# Patient Record
Sex: MALE | Race: WHITE
[De-identification: names, ages, dates, MRNs, and addresses within clinical notes are randomized per-mention and may not be internally consistent; named-entity substitution may affect disease eponyms.]

---

## 2018-04-01 ENCOUNTER — HOSPITAL ENCOUNTER (EMERGENCY)
Dept: HOSPITAL 45 - C.EDB | Age: 28
Discharge: HOME | End: 2018-04-01
Payer: COMMERCIAL

## 2018-04-01 VITALS
HEIGHT: 70 IN | BODY MASS INDEX: 22.16 KG/M2 | WEIGHT: 154.76 LBS | HEIGHT: 70 IN | WEIGHT: 154.76 LBS | BODY MASS INDEX: 22.16 KG/M2

## 2018-04-01 VITALS — TEMPERATURE: 98.06 F

## 2018-04-01 VITALS — HEART RATE: 68 BPM | DIASTOLIC BLOOD PRESSURE: 74 MMHG | SYSTOLIC BLOOD PRESSURE: 126 MMHG | OXYGEN SATURATION: 98 %

## 2018-04-01 DIAGNOSIS — Z82.49: ICD-10-CM

## 2018-04-01 DIAGNOSIS — M13.0: Primary | ICD-10-CM

## 2018-04-01 DIAGNOSIS — F17.210: ICD-10-CM

## 2018-04-01 LAB
BASOPHILS # BLD: 0.02 K/UL (ref 0–0.2)
BASOPHILS NFR BLD: 0.5 %
BUN SERPL-MCNC: 14 MG/DL (ref 7–18)
CALCIUM SERPL-MCNC: 8.5 MG/DL (ref 8.5–10.1)
CO2 SERPL-SCNC: 30 MMOL/L (ref 21–32)
CREAT SERPL-MCNC: 1.08 MG/DL (ref 0.6–1.4)
EOS ABS #: 0.11 K/UL (ref 0–0.5)
EOSINOPHIL NFR BLD AUTO: 194 K/UL (ref 130–400)
GLUCOSE SERPL-MCNC: 102 MG/DL (ref 70–99)
HCT VFR BLD CALC: 38.1 % (ref 42–52)
HGB BLD-MCNC: 13 G/DL (ref 14–18)
IG#: 0.01 K/UL (ref 0–0.02)
IMM GRANULOCYTES NFR BLD AUTO: 19.5 %
LYMPHOCYTES # BLD: 0.83 K/UL (ref 1.2–3.4)
MCH RBC QN AUTO: 28.2 PG (ref 25–34)
MCHC RBC AUTO-ENTMCNC: 34.1 G/DL (ref 32–36)
MCV RBC AUTO: 82.6 FL (ref 80–100)
MONO ABS #: 0.21 K/UL (ref 0.11–0.59)
MONOCYTES NFR BLD: 4.9 %
NEUT ABS #: 3.07 K/UL (ref 1.4–6.5)
NEUTROPHILS # BLD AUTO: 2.6 %
NEUTROPHILS NFR BLD AUTO: 72.3 %
PMV BLD AUTO: 10.5 FL (ref 7.4–10.4)
POTASSIUM SERPL-SCNC: 3.3 MMOL/L (ref 3.5–5.1)
RED CELL DISTRIBUTION WIDTH CV: 13.2 % (ref 11.5–14.5)
RED CELL DISTRIBUTION WIDTH SD: 40.2 FL (ref 36.4–46.3)
SODIUM SERPL-SCNC: 141 MMOL/L (ref 136–145)
WBC # BLD AUTO: 4.25 K/UL (ref 4.8–10.8)

## 2018-04-01 NOTE — EMERGENCY ROOM VISIT NOTE
History


Report prepared by Bhaskar:  Cuong Baker


Under the Supervision of:  Dr. Ayo Renee M.D.


First contact with patient:  20:04


Chief Complaint:  SWELLING TO EXTREMITY


Stated Complaint:  SWELLING TO HANDS AND KNEES





History of Present Illness


The patient is a 27 year old male who presents to the Emergency Room with 

complaints of worsening joint swelling starting five days ago.  He states the 

pain is in all of his joints, specifically his elbows wrists and knees legs 

bilaterally.  He rates his pain as a 6/10 in severity. He describes the pain as 

a dull pain. The patient states that he developed a fever one night a week ago. 

He reports that he thought he was developing an infection, which prompted him 

to take Amoxicillin. The patient states he never saw a doctor for his symptoms 

and he obtained the Amoxicillin from his grandparents. He reports that earlier 

this week he was sanding his back deck. The patient states the day following he 

noticed his joints, including his knuckles, knees, and wrists began to swell. 

He reports that he had to sand the next day and noticed that his swelling 

worsened. The patient states he is unable to kneel without pain and it is 

painful to lift a gallon of milk. He denies any rashes. The patient does report 

he recently had to wrestle someone who was stealing from him in his neighbor's 

backyard about 10 days ago. He reports that he was not in the woods and does 

not have any tick bites.





   Source of History:  patient


   Onset:  five days ago


   Position:  other (joints)


   Symptom Intensity:  6/10


   Quality:  dull, other (swelling)


   Timing:  worsening


   Associated Symptoms:  + fevers, No rash





Review of Systems


See HPI for pertinent positives and negatives.  A total of ten systems were 

reviewed and were otherwise negative.





Past Medical & Surgical


Medical Problems:


(1) No significant past medical history








Family History





Hypertension





Social History


Smoking Status:  Current Every Day Smoker


Alcohol Use:  none


Drug Use:  none


Marital Status:  


Occupation Status:  employed





Current/Historical Medications


Scheduled


Amoxicillin (Amoxicillin), 250 MG PO BID





Scheduled PRN


Ibuprofen (Advil), 200-600 MG PO Q4H PRN for Pain


Ibuprofen Tab (Motrin), 800 MG PO Q8H PRN for Pain





Allergies


Coded Allergies:  


     No Known Allergies (Verified , 4/1/18)





Physical Exam


Vital Signs











  Date Time  Temp Pulse Resp B/P (MAP) Pulse Ox O2 Delivery O2 Flow Rate FiO2


 


4/1/18 23:03  67 20 122/71 99 Room Air  


 


4/1/18 21:36  72 16 133/78 98 Room Air  


 


4/1/18 19:57 36.7 71 20 138/79 98 Room Air  











Physical Exam


Physical Exam 


GENERAL:  He is oriented to person, place, and time. He appears well-developed 

and well-nourished. He does not appear distressed. ____


HENT:  Exam performed. 


   Head:  Normocephalic and atraumatic. 


   Right Ear:  External ear normal. No mastoid tenderness. 


   Left Ear: External ear normal. No mastoid tenderness. 


   Mouth/Throat:  The oropharynx is clear and moist. No trismus in the jaw. No 

dental abscesses or uvula swelling. No oropharyngeal exudate or tonsillar 

abscesses. ____


EYES: Conjunctivae and EOM are normal. Pupils are equal, round, and reactive to 

light. Right eye exhibits no discharge. Left eye exhibits no discharge. No 

scleral icterus. ____


NECK: Normal range of motion. Neck supple. No JVD present. No spinous process 

tenderness present. No carotid bruit present. No rigidity. No tracheal 

deviation and normal range of motion present. No Brudzinski's sign and no Kernig

's sign noted. ____


CV: Normal rate, regular rhythm, normal heart sounds and intact distal pulses. 

There is no peripheral edema. Palpable radial pulses bue.  ____


PULM/CHEST:  Effort normal and breath sounds normal. No respiratory distress. 

No stridor. He has no wheezes. He has no rales. 


   Chest Wall:  He exhibits no tenderness. ____


ABD: The abdomen is soft. Bowel sounds are normal. He has no distension. No 

mass is present. There is no tenderness. There is no rebound, no guarding, no 

Grigsby's sign and no tenderness at McBurney's point. Rovsig negative ________


MUSC/SKEL: Normal range of motion. There is no peripheral edema, tenderness or 

deformity. There is no joint swelling. Full range of motion of all joints.  

Motor and sensation intact in the medial radial ulnar nerve distributions 

bilaterally.  Palpable radial pulses bilateral upper extremities.  Compartments 

soft bilateral upper extremities.  ________ 


LYMPH: No cervical adenopathy. ____


NEURO: He is alert and oriented to person, place, and time. He has normal 

strength. No cranial nerve deficit or sensory deficit. Coordination and gait 

normal. GCS eye subscore is 4. GCS verbal subscore is 5. GCS motor subscore is 

6. Cerebellar tests wnl. ____


SKIN: Skin is warm and dry. He is not diaphoretic. ____


PSYCH: He has a normal mood and affect. His behavior is normal. Judgment and 

thought content normal. ____





Medical Decision & Procedures


Laboratory Results


4/1/18 20:32








Red Blood Count 4.61, Mean Corpuscular Volume 82.6, Mean Corpuscular Hemoglobin 

28.2, Mean Corpuscular Hemoglobin Concent 34.1, Mean Platelet Volume 10.5, 

Neutrophils (%) (Auto) 72.3, Lymphocytes (%) (Auto) 19.5, Monocytes (%) (Auto) 

4.9, Eosinophils (%) (Auto) 2.6, Basophils (%) (Auto) 0.5, Neutrophils # (Auto) 

3.07, Lymphocytes # (Auto) 0.83, Monocytes # (Auto) 0.21, Eosinophils # (Auto) 

0.11, Basophils # (Auto) 0.02





4/1/18 20:32

















Test


  4/1/18


20:32


 


White Blood Count


  4.25 K/uL


(4.8-10.8)


 


Red Blood Count


  4.61 M/uL


(4.7-6.1)


 


Hemoglobin


  13.0 g/dL


(14.0-18.0)


 


Hematocrit 38.1 % (42-52) 


 


Mean Corpuscular Volume


  82.6 fL


()


 


Mean Corpuscular Hemoglobin


  28.2 pg


(25-34)


 


Mean Corpuscular Hemoglobin


Concent 34.1 g/dl


(32-36)


 


Platelet Count


  194 K/uL


(130-400)


 


Mean Platelet Volume


  10.5 fL


(7.4-10.4)


 


Neutrophils (%) (Auto) 72.3 % 


 


Lymphocytes (%) (Auto) 19.5 % 


 


Monocytes (%) (Auto) 4.9 % 


 


Eosinophils (%) (Auto) 2.6 % 


 


Basophils (%) (Auto) 0.5 % 


 


Neutrophils # (Auto)


  3.07 K/uL


(1.4-6.5)


 


Lymphocytes # (Auto)


  0.83 K/uL


(1.2-3.4)


 


Monocytes # (Auto)


  0.21 K/uL


(0.11-0.59)


 


Eosinophils # (Auto)


  0.11 K/uL


(0-0.5)


 


Basophils # (Auto)


  0.02 K/uL


(0-0.2)


 


RDW Standard Deviation


  40.2 fL


(36.4-46.3)


 


RDW Coefficient of Variation


  13.2 %


(11.5-14.5)


 


Immature Granulocyte % (Auto) 0.2 % 


 


Immature Granulocyte # (Auto)


  0.01 K/uL


(0.00-0.02)


 


Erythrocyte Sedimentation Rate


  10 mm/hr


(0-14)


 


Anion Gap


  6.0 mmol/L


(3-11)


 


Est Creatinine Clear Calc


Drug Dose 100.2 ml/min 


 


 


Estimated GFR (


American) 106.9 


 


 


Estimated GFR (Non-


American 92.3 


 


 


BUN/Creatinine Ratio 13.1 (10-20) 


 


Calcium Level


  8.5 mg/dl


(8.5-10.1)


 


Total Creatine Kinase


  103 U/L


()


 


C-Reactive Protein


  1.08 mg/dl


(0-0.29)


 


Lyme Disease IgG Antibody NEG (NEG) 


 


Lyme Disease IgM Antibody NEG (NEG) 


 


Anti-Streptolysin O Antibody


Screen POS IU/ml


(<200 IU)


 


Anti-Streptolysin O Antibody


Titer 200 IU/ml


(<200 IU)





Laboratory results reviewed by me





Medications Administered











 Medications


  (Trade)  Dose


 Ordered  Sig/Elizabeth


 Route  Start Time


 Stop Time Status Last Admin


Dose Admin


 


 Sodium Chloride  1,000 ml @ 


 999 mls/hr  Q1H1M STAT


 IV  4/1/18 20:15


 4/1/18 21:15 DC 4/1/18 20:45


999 MLS/HR


 


 Ketorolac


 Tromethamine


  (Toradol Inj)  15 mg  NOW  STAT


 IV  4/1/18 22:49


 4/1/18 22:50 DC 4/1/18 23:00


15 MG











ECG Per My Interpretation


Indication:  other (arrhythmia)


Rate (beats per minute):  69


Rhythm:  other (sinus arrhythmia)


Findings:  other (OK QRS QTC WNL, No ST elevation or depression)





ED Course


2014: The patient was evaluated in room B11B. A complete history and physical 

exam was performed.





2015: Ordered Sodium Chloride 1000 ml @ 999 mls/hr IV.





2249: CBC within normal limits.  BMP within normal limits with the exception of 

potassium 3.3, replaced orally in the emergency department.  ASO titers 

positive.  Given the patient's ASO titers are positive, ESR and CRP will be 

added.  There is concern for possible poststreptococcal polyarthritis.  Patient 

does not report any sort of penile discharge, or concern for STD, not concerned 

for disseminated gonococcal arthritis.  Ordered Toradol Injection 15 mg IV.





2318: Vital signs stable.  ESR within normal limits.  CRP elevated at 1.08.  

Discussed with Dr. Lion, infectious disease, he states he is not concerned 

about any sort of rheumatic fever or post streptococcal polyarthritis.  He 

states that the ASO in 200 is not concerning and the patient does not need to 

be treated with antibiotics for any sort of streptococcal infection.  He 

recommends that the patient follow-up with rheumatology.  I did discuss this 

with the patient's family member at bedside, they state they will follow up 

with her PCP.   met with the patient took down the information to try 

to facilitate appointment with PCP for follow-up as well as appointment with 

rheumatology.DISCHARGE - Plan of care discussed with patient and questions 

answered. The patient was given both verbal and printed discharge instructions. 

The patient verbalized understanding and ability to comply. The patient is to 

seek outpatient follow up as noted in the discharge instructions. The patient 

verbalized understanding and ability to comply. The patient is discharged in 

stable condition. The patient was instructed to return for worsening symptoms.





Medical Decision


2249: CBC within normal limits.  BMP within normal limits with the exception of 

potassium 3.3, replaced orally in the emergency department.  ASO titers 

positive.  Given the patient's ASO titers are positive, ESR and CRP will be 

added.  There is concern for possible poststreptococcal polyarthritis.  Patient 

does not report any sort of penile discharge, or concern for STD, not concerned 

for disseminated gonococcal arthritis.  Ordered Toradol Injection 15 mg IV.





2318: Vital signs stable.  ESR within normal limits.  CRP elevated at 1.08.  

Discussed with Dr. Lion, infectious disease, he states he is not concerned 

about any sort of rheumatic fever or post streptococcal polyarthritis.  He 

states that the ASO in 200 is not concerning and the patient does not need to 

be treated with antibiotics for any sort of streptococcal infection.  He 

recommends that the patient follow-up with rheumatology.  I did discuss this 

with the patient's family member at bedside, they state they will follow up 

with her PCP.   met with the patient took down the information to try 

to facilitate appointment with PCP for follow-up as well as appointment with 

rheumatology.DISCHARGE - Plan of care discussed with patient and questions 

answered. The patient was given both verbal and printed discharge instructions. 

The patient verbalized understanding and ability to comply. The patient is to 

seek outpatient follow up as noted in the discharge instructions. The patient 

verbalized understanding and ability to comply. The patient is discharged in 

stable condition. The patient was instructed to return for worsening symptoms.





Medication Reconcilliation


Current Medication List:  was personally reviewed by me





Blood Pressure Screening


Patient's blood pressure:  Elevated blood pressure





Consults


Time Called:  2310


Consulting Physician:  Amisha, ID


Returned Call:  [phone]


Discussed with Dr. Lion, infectious disease, he states he is not concerned 

about any sort of rheumatic fever or post streptococcal polyarthritis.  He 

states that the ASO in 200 is not concerning and the patient does not need to 

be treated with antibiotics for any sort of streptococcal infection.  He 

recommends that the patient follow-up with rheumatology.





Impression





 Primary Impression:  


 Polyarthritis





Scribe Attestation


The scribe's documentation has been prepared under my direction and personally 

reviewed by me in its entirety. I confirm that the note above accurately 

reflects all work, treatment, procedures, and medical decision making performed 

by me.





Departure Information


Prescriptions





Ibuprofen Tab (MOTRIN) 800 Mg Tab


800 MG PO Q8H Y for Pain, #30 TAB


   Prov: Ayo Renee M.D.         4/1/18





Referrals


Adan Shaw (PCP)





Patient Instructions


My [facility]